# Patient Record
Sex: MALE | Race: WHITE | HISPANIC OR LATINO | Employment: STUDENT | URBAN - METROPOLITAN AREA
[De-identification: names, ages, dates, MRNs, and addresses within clinical notes are randomized per-mention and may not be internally consistent; named-entity substitution may affect disease eponyms.]

---
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
## 2022-07-08 NOTE — ANESTHESIA PREPROCEDURE EVALUATION
Review of Systems/Medical History  Patient summary reviewed  Chart reviewed  No history of anesthetic complications     Cardiovascular  Exercise tolerance (METS): >4,     Pulmonary  Asthma , well controlled/ stable , No recent URI ,        GI/Hepatic            Endo/Other     GYN       Hematology   Musculoskeletal       Neurology   Psychology           Physical Exam    Airway    Mallampati score: II  TM Distance: >3 FB       Dental   No notable dental hx     Cardiovascular      Pulmonary      Other Findings        Anesthesia Plan  ASA Score- 2     Anesthesia Type- IV sedation with anesthesia with ASA Monitors  Additional Monitors:   Airway Plan:     Comment: AUGIE Lu , have personally seen and evaluated the patient prior to anesthetic care  I have reviewed the pre-anesthetic record, and other medical records if appropriate to the anesthetic care  If a CRNA is involved in the case, I have reviewed the CRNA assessment, if present, and agree  Risks/benefits and alternatives discussed with patient including possible PONV, sore throat, and possibility of rare anesthetic and surgical emergencies        Plan Factors-    Induction-     Postoperative Plan-     Informed Consent- Anesthetic plan and risks discussed with patient  I personally reviewed this patient with the CRNA  Discussed and agreed on the Anesthesia Plan with the CRNA  Luis Daniel Hong
Progress slowly

## 2025-01-16 NOTE — PROGRESS NOTES
Assessment/Plan:    Grinding epigastric pain with history of H pylori infection and recent traveling to [place]-  Endoscopy and famotidine  Change in bowel habits with looser stools lower abdominal pain  Dicyclomine and colonoscopy       Diagnoses and all orders for this visit:    Epigastric pain  -     famotidine (PEPCID) 20 mg tablet; Take 1 tablet (20 mg total) by mouth 2 (two) times a day    Generalized abdominal pain  -     dicyclomine (BENTYL) 10 mg capsule; Take 1 capsule (10 mg total) by mouth every 6 (six) hours as needed (abdominal pain)    History of Helicobacter pylori infection  -     famotidine (PEPCID) 20 mg tablet; Take 1 tablet (20 mg total) by mouth 2 (two) times a day    Change in consistency of stool  -     dicyclomine (BENTYL) 10 mg capsule; Take 1 capsule (10 mg total) by mouth every 6 (six) hours as needed (abdominal pain)            Subjective:      Patient ID: Maria Eugenia Reeves is a 18 y o  male  74-year-old male with recent emergency room visit for abdominal pain and was told to follow up with GI  They did a urinalysis in the ER that was negative and sent him home on naproxen and a multiple  This is a gentleman that has history of bowel movements every other day but was having and a diarrhea with worsening generalized abdominal pain including epigastric pain and nausea  He had no fever no other systemic complaints he has had no recent change in his diet the abdominal pain does not radiate anywhere  He does have some bloating  Normal bowel habits are every day to every other day soft and formed  He did have a episode of frequent loose stools over the course of one day and he was given Lomotil in the ER  He had an endoscopy five years ago for epigastric pain was found to have H pylori infection  He had stool test for H pylori for continuing epigastric pain in 2018 that was negative  Certain foods do bother him but he is very vague about that    He has never had a colonoscopy, dicyclomine or Pepcid  He denies melena or hematochezia    Diarrhea    Associated symptoms include abdominal pain  The following portions of the patient's history were reviewed and updated as appropriate: He  has a past medical history of Asthma  He   Patient Active Problem List    Diagnosis Date Noted    Change in consistency of stool 03/20/2019    History of Helicobacter pylori infection 03/20/2019    Generalized abdominal pain 03/20/2019    Epigastric pain 03/20/2019     He  has no past surgical history on file  His family history is not on file  He  reports that he has never smoked  He has never used smokeless tobacco  He reports that he does not drink alcohol or use drugs  Current Outpatient Medications   Medication Sig Dispense Refill    diphenoxylate-atropine (LOMOTIL) 2 5-0 025 mg per tablet Take 1 tablet by mouth 4 (four) times a day as needed for diarrhea for up to 10 days 20 tablet 0    montelukast (SINGULAIR) 10 mg tablet Take 10 mg by mouth daily at bedtime      benzoyl peroxide-erythromycin (BENZAMYCIN) gel Apply topically daily at bedtime (Patient not taking: Reported on 3/20/2019) 46 6 g 3    dicyclomine (BENTYL) 10 mg capsule Take 1 capsule (10 mg total) by mouth every 6 (six) hours as needed (abdominal pain) 30 capsule 3    famotidine (PEPCID) 20 mg tablet Take 1 tablet (20 mg total) by mouth 2 (two) times a day 180 tablet 0    naproxen (NAPROSYN) 500 mg tablet Take 1 tablet (500 mg total) by mouth 2 (two) times a day with meals (Patient not taking: Reported on 3/20/2019) 20 tablet 0    tretinoin (RETIN-A) 0 025 % cream Apply topically daily at bedtime (Patient not taking: Reported on 3/20/2019) 45 g 3     No current facility-administered medications for this visit        Current Outpatient Medications on File Prior to Visit   Medication Sig    diphenoxylate-atropine (LOMOTIL) 2 5-0 025 mg per tablet Take 1 tablet by mouth 4 (four) times a day as needed for diarrhea for up to 10 days    montelukast (SINGULAIR) 10 mg tablet Take 10 mg by mouth daily at bedtime    benzoyl peroxide-erythromycin (BENZAMYCIN) gel Apply topically daily at bedtime (Patient not taking: Reported on 3/20/2019)    naproxen (NAPROSYN) 500 mg tablet Take 1 tablet (500 mg total) by mouth 2 (two) times a day with meals (Patient not taking: Reported on 3/20/2019)    tretinoin (RETIN-A) 0 025 % cream Apply topically daily at bedtime (Patient not taking: Reported on 3/20/2019)     Current Facility-Administered Medications on File Prior to Visit   Medication    [COMPLETED] diphenoxylate-atropine (LOMOTIL) 2 5-0 025 mg per tablet 1 tablet    [COMPLETED] ibuprofen (MOTRIN) tablet 600 mg     He has No Known Allergies       Review of Systems   Constitutional: Positive for fatigue  Respiratory: Negative  Cardiovascular: Negative  Gastrointestinal: Positive for abdominal distention, abdominal pain, diarrhea and nausea  Objective:      /74   Pulse 80   Resp 16   Ht 5' 7" (1 702 m)   Wt 79 8 kg (176 lb)   BMI 27 57 kg/m²          Physical Exam   Constitutional: He appears well-developed and well-nourished  Cardiovascular: Normal rate and regular rhythm  Pulmonary/Chest: Effort normal and breath sounds normal    Abdominal: Soft  Bowel sounds are normal  There is tenderness  Detail Level: Simple Post-Care Instructions: I reviewed with the patient in detail post-care instructions. Patient is to wear sunprotection, and avoid picking at any of the treated lesions. Pt may apply Vaseline to crusted or scabbing areas. Patient verbalizes understanding. Written instructions provided. Show Topical Anesthesia Variable?: Yes Number Of Freeze-Thaw Cycles: 1 freeze-thaw cycle Spray Paint Text: The liquid nitrogen was applied to the skin utilizing a spray paint frosting technique. Medical Necessity Information: It is in your best interest to select a reason for this procedure from the list below. All of these items fulfill various CMS LCD requirements except the new and changing color options. Spray Paint Technique: No Medical Necessity Clause: This procedure was medically necessary because the lesions that were treated were: Consent: The patient's consent was obtained including but not limited to risks of crusting, scabbing, blistering, scarring, darker or lighter pigmentary change, recurrence, incomplete removal and infection. Duration Of Freeze Thaw-Cycle (Seconds): 0 Post-Care Instructions: I reviewed with the patient in detail post-care instructions. Patient verbalizes understanding and written instructions were provided. Patient is to wear sunprotection, and avoid picking at any of the treated lesions. Pt may apply Vaseline to crusted or scabbing areas.